# Patient Record
Sex: FEMALE | Race: WHITE
[De-identification: names, ages, dates, MRNs, and addresses within clinical notes are randomized per-mention and may not be internally consistent; named-entity substitution may affect disease eponyms.]

---

## 2018-01-04 ENCOUNTER — HOSPITAL ENCOUNTER (INPATIENT)
Dept: HOSPITAL 92 - L&D/OP | Age: 28
LOS: 1 days | Discharge: HOME | End: 2018-01-05
Attending: OBSTETRICS & GYNECOLOGY | Admitting: OBSTETRICS & GYNECOLOGY
Payer: COMMERCIAL

## 2018-01-04 VITALS — BODY MASS INDEX: 29.4 KG/M2

## 2018-01-04 DIAGNOSIS — O34.219: Primary | ICD-10-CM

## 2018-01-04 DIAGNOSIS — N85.8: ICD-10-CM

## 2018-01-04 DIAGNOSIS — Z3A.39: ICD-10-CM

## 2018-01-04 LAB
HBSAG INDEX: 0.23 S/CO (ref 0–0.99)
HGB BLD-MCNC: 13.1 G/DL (ref 12–16)
MCH RBC QN AUTO: 33.5 PG (ref 27–31)
MCV RBC AUTO: 95.2 FL (ref 81–99)
PLATELET # BLD AUTO: 214 THOU/UL (ref 130–400)
RBC # BLD AUTO: 3.91 MILL/UL (ref 4.2–5.4)
SYPHILIS ANTIBODY INDEX: 0.04 S/CO
WBC # BLD AUTO: 12.6 THOU/UL (ref 4.8–10.8)

## 2018-01-04 PROCEDURE — 85027 COMPLETE CBC AUTOMATED: CPT

## 2018-01-04 PROCEDURE — 99285 EMERGENCY DEPT VISIT HI MDM: CPT

## 2018-01-04 PROCEDURE — 86850 RBC ANTIBODY SCREEN: CPT

## 2018-01-04 PROCEDURE — 86900 BLOOD TYPING SEROLOGIC ABO: CPT

## 2018-01-04 PROCEDURE — 86780 TREPONEMA PALLIDUM: CPT

## 2018-01-04 PROCEDURE — 86901 BLOOD TYPING SEROLOGIC RH(D): CPT

## 2018-01-04 PROCEDURE — 0KQM0ZZ REPAIR PERINEUM MUSCLE, OPEN APPROACH: ICD-10-PCS | Performed by: OBSTETRICS & GYNECOLOGY

## 2018-01-04 PROCEDURE — 87340 HEPATITIS B SURFACE AG IA: CPT

## 2018-01-04 RX ADMIN — DOCUSATE CALCIUM SCH MG: 240 CAPSULE, LIQUID FILLED ORAL at 22:23

## 2018-01-04 RX ADMIN — Medication SCH MLS: at 03:30

## 2018-01-04 RX ADMIN — DOCUSATE CALCIUM SCH: 240 CAPSULE, LIQUID FILLED ORAL at 13:25

## 2018-01-04 RX ADMIN — Medication SCH MLS: at 05:21

## 2018-01-04 NOTE — PDOC.OPDEL
OB Operative/Delivery Note


Delivery Dr/Surgeon: Dwayne Babcock


Assist: none


Pre-Delivery Diagnosis: active labor, other (Precipitous labor with controlled 

, TOLAC #1)


Weeks gestation: 39


Anesthesia: local





- Additional Findings/Plan


Placenta delivered: spontaneous (Valle mechanism, intact. Delievered at 5 

minutes post baby (approx).)


Repaired Obstetrical Laceration: 2nd degree (REpaired with 2-0 vicryl, local.)


Estimated blood loss: 200


Compilations/Other Findings: 


NC X 1 reduced, 3VC. Baby vigorous. Counts correct.


Post delivery plan: routine recovery (Successful .)

## 2018-01-05 VITALS — TEMPERATURE: 98 F | SYSTOLIC BLOOD PRESSURE: 116 MMHG | DIASTOLIC BLOOD PRESSURE: 75 MMHG

## 2018-01-05 RX ADMIN — DOCUSATE CALCIUM SCH MG: 240 CAPSULE, LIQUID FILLED ORAL at 08:08

## 2018-01-05 NOTE — PDOC.PP
Post Partum Progress Note


Post Partum Day #: 1


PO intake tolerated: yes


Flatus: yes


Ambulation: yes


 Vital Signs (12 hours)











  Temp Pulse Resp BP


 


 18 08:24  98.0 F  77  20  116/75


 


 18 07:40  98.9 F  54 L  16 


 


 18 00:55  98.9 F  54 L  16  118/73








 Weight











Weight                         177 lb

















- Physical Examination


General: NAD


Cardiovascular: no m/r/g, RRR


Respiratory: clear to auscultation bilaterally, non-labored breathing


Abdominal: + bowel sounds, lochia, no distention, appropriately TTP


Result Diagrams: 


 18 02:49





Additional Labs: 


 Post Partum Labs











Blood Type  A POSITIVE   18  02:49    


 


Hep Bs Antigen  Non-Reactive S/CO (NonReactive)   18  02:49    














- Assessment/Plan





doing well post partum day 1





d/c home f/u 6 weeks